# Patient Record
Sex: FEMALE | Employment: STUDENT | URBAN - METROPOLITAN AREA
[De-identification: names, ages, dates, MRNs, and addresses within clinical notes are randomized per-mention and may not be internally consistent; named-entity substitution may affect disease eponyms.]

---

## 2019-01-10 ENCOUNTER — APPOINTMENT (OUTPATIENT)
Dept: OTHER | Facility: HOSPITAL | Age: 24
End: 2019-01-10
Attending: PREVENTIVE MEDICINE

## 2019-06-19 ENCOUNTER — OFFICE VISIT (OUTPATIENT)
Dept: OBGYN CLINIC | Facility: CLINIC | Age: 24
End: 2019-06-19
Payer: COMMERCIAL

## 2019-06-19 VITALS
HEIGHT: 65 IN | HEART RATE: 120 BPM | BODY MASS INDEX: 34.82 KG/M2 | DIASTOLIC BLOOD PRESSURE: 94 MMHG | SYSTOLIC BLOOD PRESSURE: 136 MMHG | WEIGHT: 209 LBS

## 2019-06-19 DIAGNOSIS — Z30.011 BCP (BIRTH CONTROL PILLS) INITIATION: ICD-10-CM

## 2019-06-19 DIAGNOSIS — Z01.419 ENCOUNTER FOR GYNECOLOGICAL EXAMINATION WITHOUT ABNORMAL FINDING: Primary | ICD-10-CM

## 2019-06-19 PROCEDURE — 99385 PREV VISIT NEW AGE 18-39: CPT | Performed by: OBSTETRICS & GYNECOLOGY

## 2019-06-19 RX ORDER — ACETAMINOPHEN AND CODEINE PHOSPHATE 120; 12 MG/5ML; MG/5ML
0.35 SOLUTION ORAL DAILY
Qty: 3 PACKAGE | Refills: 3 | Status: SHIPPED | OUTPATIENT
Start: 2019-06-19 | End: 2020-06-30

## 2019-06-19 RX ORDER — NORGESTIMATE AND ETHINYL ESTRADIOL 0.25-0.035
1 KIT ORAL DAILY
COMMUNITY
End: 2019-06-19

## 2019-06-19 NOTE — PROGRESS NOTES
Well Woman Exam    HPI:  The patient is a 22yo female who presents for annual exam. She has no complaints. She had pap last year with PCP.  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 WWE:   1.  Reviewed ASCCP guidel

## 2020-05-13 ENCOUNTER — TELEPHONE (OUTPATIENT)
Dept: OBGYN CLINIC | Facility: CLINIC | Age: 25
End: 2020-05-13

## 2020-05-13 RX ORDER — ACETAMINOPHEN AND CODEINE PHOSPHATE 120; 12 MG/5ML; MG/5ML
0.35 SOLUTION ORAL DAILY
Qty: 3 PACKAGE | Refills: 3 | OUTPATIENT
Start: 2020-05-13

## 2020-05-13 NOTE — TELEPHONE ENCOUNTER
LAST ANNUAL 6-19-19 AND NOTES FROM THAT APPT INDICATE PT HAD NEGATIVE PAP IN 2018 ALTHOUGH NOT IN OUR SYSTEM. THERE IS NO FUTURE APPT MADE SO SENT BACK FAX DENIED AND NOTED APPT IS REQUIRED.

## 2020-05-20 RX ORDER — ACETAMINOPHEN AND CODEINE PHOSPHATE 120; 12 MG/5ML; MG/5ML
0.35 SOLUTION ORAL DAILY
Qty: 1 PACKAGE | Refills: 0 | Status: SHIPPED | OUTPATIENT
Start: 2020-05-20 | End: 2020-06-17

## 2020-05-20 RX ORDER — ACETAMINOPHEN AND CODEINE PHOSPHATE 120; 12 MG/5ML; MG/5ML
SOLUTION ORAL
Qty: 84 TABLET | Refills: 0 | OUTPATIENT
Start: 2020-05-20

## 2020-06-30 ENCOUNTER — OFFICE VISIT (OUTPATIENT)
Dept: OBGYN CLINIC | Facility: CLINIC | Age: 25
End: 2020-06-30
Payer: COMMERCIAL

## 2020-06-30 VITALS
WEIGHT: 199 LBS | SYSTOLIC BLOOD PRESSURE: 142 MMHG | DIASTOLIC BLOOD PRESSURE: 95 MMHG | BODY MASS INDEX: 33.15 KG/M2 | HEART RATE: 139 BPM | HEIGHT: 65 IN

## 2020-06-30 DIAGNOSIS — Z30.41 ENCOUNTER FOR BIRTH CONTROL PILLS MAINTENANCE: ICD-10-CM

## 2020-06-30 DIAGNOSIS — Z01.419 ENCOUNTER FOR GYNECOLOGICAL EXAMINATION WITHOUT ABNORMAL FINDING: Primary | ICD-10-CM

## 2020-06-30 DIAGNOSIS — Z12.4 CERVICAL CANCER SCREENING: ICD-10-CM

## 2020-06-30 PROCEDURE — 99395 PREV VISIT EST AGE 18-39: CPT | Performed by: OBSTETRICS & GYNECOLOGY

## 2020-06-30 RX ORDER — ACETAMINOPHEN AND CODEINE PHOSPHATE 120; 12 MG/5ML; MG/5ML
0.35 SOLUTION ORAL DAILY
Qty: 3 PACKAGE | Refills: 4 | Status: SHIPPED | OUTPATIENT
Start: 2020-06-30 | End: 2021-03-15

## 2020-06-30 RX ORDER — NORGESTIMATE AND ETHINYL ESTRADIOL 0.25-0.035
KIT ORAL
COMMUNITY
Start: 2019-04-02 | End: 2020-06-30

## 2020-06-30 NOTE — PROGRESS NOTES
Well Woman Exam    HPI:  The patient is a 29yo female who presents today for annual exam. She has no complaints. She is happy with POP. She had to cancel her wedding and will elope in the next few weeks. Wedding in August 2021.  She is nurse in special care Fear of current or ex partner: Not on file        Emotionally abused: Not on file        Physically abused: Not on file        Forced sexual activity: Not on file    Other Topics      Concerns:        Not on file    Social History Narrative      Not on Normal appearance without lesions, no abnormal discharge  Cervix:  Normal without tenderness on motion  Uterus: normal in size, contour, position, mobility, without tenderness  Adnexa: normal without masses or tenderness  Perineum: normal    Assessment/Ariel

## 2020-07-02 DIAGNOSIS — Z78.9 PARTICIPANT IN HEALTH AND WELLNESS PLAN: Primary | ICD-10-CM

## 2021-12-07 ENCOUNTER — NURSE ONLY (OUTPATIENT)
Dept: INTERNAL MEDICINE CLINIC | Facility: HOSPITAL | Age: 26
End: 2021-12-07
Attending: EMERGENCY MEDICINE

## 2021-12-07 DIAGNOSIS — Z00.00 WELLNESS EXAMINATION: Primary | ICD-10-CM

## 2021-12-07 PROCEDURE — 86480 TB TEST CELL IMMUN MEASURE: CPT

## 2022-01-10 ENCOUNTER — TELEPHONE (OUTPATIENT)
Dept: INTERNAL MEDICINE CLINIC | Facility: HOSPITAL | Age: 27
End: 2022-01-10

## 2022-01-10 NOTE — TELEPHONE ENCOUNTER
Outside Covid Testing done    Results and RTW guidelines:    COVID RESULT reported:      Test type:    [x] Rapid outside         [x] PCR outside pending       [] Home Test    Date of test: 1/8/22     Test location: [facility]         [] Result viewed in E ordered; continue to monitor sx and call for new/worsening sx.   Discuss RTW guidelines with manager  [] May continue to work  [] Follow up with PCP  [] Home until further instruction from hotline with [facility] results  INSTRUCTIONS PROVIDED:  [x]  Plan as n Upset stomach []      • Additional symptoms:         Employee has positive COVID Exposure?   Yes [x]     No []      Date of exposure:  ?   [x]  Coworker                       [x] patient                        [] Family/friend    PPE worn:    [x] Mask

## 2024-06-20 ENCOUNTER — NURSE ONLY (OUTPATIENT)
Dept: INTERNAL MEDICINE CLINIC | Facility: HOSPITAL | Age: 29
End: 2024-06-20
Attending: PREVENTIVE MEDICINE

## 2024-06-20 DIAGNOSIS — Z00.00 WELLNESS EXAMINATION: Primary | ICD-10-CM

## 2024-06-20 PROCEDURE — 86480 TB TEST CELL IMMUN MEASURE: CPT

## 2024-06-21 LAB
M TB IFN-G CD4+ T-CELLS BLD-ACNC: 0.01 IU/ML
M TB TUBERC IFN-G BLD QL: NEGATIVE
M TB TUBERC IGNF/MITOGEN IGNF CONTROL: >10 IU/ML
QFT TB1 AG MINUS NIL: 0.02 IU/ML
QFT TB2 AG MINUS NIL: 0.02 IU/ML

## 2024-10-13 ENCOUNTER — HOSPITAL ENCOUNTER (OUTPATIENT)
Age: 29
Discharge: HOME | End: 2024-10-13
Payer: COMMERCIAL

## 2024-10-13 VITALS
TEMPERATURE: 98 F | OXYGEN SATURATION: 97 % | DIASTOLIC BLOOD PRESSURE: 93 MMHG | SYSTOLIC BLOOD PRESSURE: 140 MMHG | RESPIRATION RATE: 20 BRPM | HEART RATE: 112 BPM

## 2024-10-13 DIAGNOSIS — J18.9 WALKING PNEUMONIA: Primary | ICD-10-CM

## 2024-10-13 PROCEDURE — 99204 OFFICE O/P NEW MOD 45 MIN: CPT

## 2024-10-13 PROCEDURE — 99213 OFFICE O/P EST LOW 20 MIN: CPT

## 2024-10-13 RX ORDER — AMOXICILLIN 500 MG/1
1000 TABLET, FILM COATED ORAL 3 TIMES DAILY
Qty: 42 TABLET | Refills: 0 | Status: SHIPPED | OUTPATIENT
Start: 2024-10-13 | End: 2024-10-13

## 2024-10-13 RX ORDER — AMOXICILLIN 500 MG/1
1000 TABLET, FILM COATED ORAL 3 TIMES DAILY
Qty: 42 TABLET | Refills: 0 | Status: SHIPPED | OUTPATIENT
Start: 2024-10-13 | End: 2024-10-20

## 2024-10-13 RX ORDER — AZITHROMYCIN 250 MG/1
TABLET, FILM COATED ORAL
Qty: 6 TABLET | Refills: 0 | Status: SHIPPED | OUTPATIENT
Start: 2024-10-13 | End: 2024-10-18

## 2024-10-13 RX ORDER — AZITHROMYCIN 250 MG/1
TABLET, FILM COATED ORAL
Qty: 6 TABLET | Refills: 0 | Status: SHIPPED | OUTPATIENT
Start: 2024-10-13 | End: 2024-10-13

## 2024-10-13 NOTE — DISCHARGE INSTRUCTIONS
Take both antibiotics as prescribed.  Continue the over-the-counter remedies suggested to you by OB.  Drink plenty of fluids.  Let your OB know that you are taking the antibiotics.  Schedule follow-up with your primary doctor

## 2024-10-13 NOTE — ED INITIAL ASSESSMENT (HPI)
Cough, chest congestion for 1 1/2 weeks, no fever, occ sore throat, no sob, pt is 8 weeks pregnant with prenatal care

## 2024-10-13 NOTE — ED PROVIDER NOTES
Patient Seen in: [facility]      History     Chief Complaint   Patient presents with    Cough/URI     Stated Complaint: Cold Symptons  Subjective:   28-year-old female with no past medical history presents from home.  Patient is here with cold symptoms for about 10 days.  States she has had a lingering cold and \"I feel like garbage\".  Complaining of cough, congestion, yellow phlegm, sore throat.  No fever.  No shortness of breath or wheezing.  No COVID testing done at home.  She has been using Mucinex, Flonase, minified air for her symptoms.  Patient notes that she is 8 weeks 2 days pregnant.  .  She is currently breast-feeding.  She works as a nurse.  She has no history of pneumonia.  She is a non-smoker    The history is provided by the patient. No  was used.     Objective:   History reviewed. No pertinent past medical history.         Past Surgical History:   Procedure Laterality Date    [place] teeth removed                Social History     Socioeconomic History    Marital status:    Tobacco Use    Smoking status: Never    Smokeless tobacco: Never   Vaping Use    Vaping status: Never Used   Substance and Sexual Activity    Alcohol use: Never    Drug use: Never    Sexual activity: Yes     Birth control/protection: OCP     Social Drivers of Health     Financial Resource Strain: Low Risk  (2023)    Received from [facility]    Overall Financial Resource Strain (CARDIA)     Difficulty of Paying Living Expenses: Not very hard   Food Insecurity: No Food Insecurity (2023)    Received from [facility]    Hunger Vital Sign     Worried About Running Out of Food in the Last Year: Never true     Ran Out of Food in the Last Year: Never true   Transportation Needs: No Transportation Needs (2023)    Received from [facility]    PRAPARE - Transportation     Lack of Transportation (Medical): No     Lack of  Transportation (Non-Medical): No   Stress: No Stress Concern Present (6/26/2023)    Received from [facility]    [employer] [place] [employer] - Occupational Stress Questionnaire     Feeling of Stress : Not at all   Housing Stability: Low Risk  (6/26/2023)    Received from [facility]    Housing Stability Vital Sign     Unable to Pay for Housing in the Last Year: No     Number of Places Lived in the Last Year: 1     Unstable Housing in the Last Year: No            Review of Systems    Positive for stated complaint: Cough/URI    Other systems are as noted in HPI.  Constitutional and vital signs reviewed.      All other systems reviewed and negative except as noted above.    Physical Exam     ED Triage Vitals [10/13/24 1017]   BP (!) 140/93   Pulse 112   Resp 20   Temp 98.3 °F (36.8 °C)   Temp src Oral   SpO2 97 %   O2 Device None (Room air)     Current:BP (!) 140/93   Pulse 112   Temp 98.3 °F (36.8 °C) (Oral)   Resp 20   LMP 08/16/2024 (Exact Date)   SpO2 97%     Physical Exam  Vitals and nursing note reviewed.   Constitutional:       General: She is not in acute distress.     Appearance: Normal appearance. She is not ill-appearing or toxic-appearing.   HENT:      Head: Normocephalic and atraumatic.      Right Ear: Tympanic membrane, ear canal and external ear normal.      Left Ear: Tympanic membrane, ear canal and external ear normal.      Nose: Nose normal.      Mouth/Throat:      Mouth: Mucous membranes are moist.      Pharynx: Oropharynx is clear. No pharyngeal swelling or posterior oropharyngeal erythema.   Eyes:      Pupils: Pupils are equal, round, and reactive to light.   Cardiovascular:      Rate and Rhythm: Regular rhythm. Tachycardia present.      Pulses: Normal pulses.      Comments:   Pulmonary:      Effort: Pulmonary effort is normal. No respiratory distress.      Comments: Slightly diminished on the right.  No wheezing.  No distress.  No  hypoxia.  Pulse ox 97% room air which is normal  Abdominal:      General: Abdomen is flat.      Palpations: Abdomen is soft.      Tenderness: There is no abdominal tenderness.   Musculoskeletal:         General: No signs of injury. Normal range of motion.      Cervical back: Normal range of motion and neck supple.   Skin:     General: Skin is warm and dry.      Capillary Refill: Capillary refill takes less than 2 seconds.   Neurological:      General: No focal deficit present.      Mental Status: She is alert and oriented to person, place, and time.      GCS: GCS eye subscore is 4. GCS verbal subscore is 5. GCS motor subscore is 6.   Psychiatric:         Mood and Affect: Mood normal.         Behavior: Behavior normal.         Thought Content: Thought content normal.         Judgment: Judgment normal.         ED Course   Radiology:  No results found.  Labs Reviewed - No data to display    MDM     Medical Decision Making  Differential diagnoses reflecting the complexity of care include: Viral URI, COVID, pneumonia  Patient with persistent cold symptoms for 10 days despite over-the-counter therapy.  Discussed COVID testing deferred given length of symptoms discussed the option of chest x-ray but patient is 8 weeks pregnant.  She is diminished on the right without acute respiratory distress or hypoxia.  Shared decision making with patient and will treat with antibiotics for pneumonia  She is pregnant and breast-feeding.  Amoxicillin and Zithromax considered safe but I recommend she speak with OB    Plan of Care: Amoxicillin.  Zithromax.  Continue OTC remedies.  Push fluids.  Talk with OB about medications.  Recheck with primary doctor    Results and plan of care discussed with the patient/family. They are in agreement with discharge. They understand to follow up with their primary doctor or the referral physician for further evaluation, especially if no improvement.  Also discussed the limitations of immediate care,  patient is aware that if symptoms are worse they should go to the emergency room. Verbal and written discharge instructions were given.     My independent interpretation of studies of: N/A  Diagnostic tests and medications considered but not ordered were: Chest x-ray, COVID testing  Shared decision making was done by: Patient agreeable to antibiotic treatment for pneumonia.  Discussed the option of COVID testing, patient declined  Comorbidities that add complexity to management include: Currently pregnant  External chart review was done and was noted: Reviewed, noncontributory  History obtained by an independent source was from: N/A  Discussions and management was done with: N/A  Social determinants of health that affect care: Currently pregnant              Problems Addressed:  Walking pneumonia: acute illness or injury    Risk  OTC drugs.  Prescription drug management.        Disposition and Plan     Clinical Impression:  1. Walking pneumonia         Disposition:  Discharge  10/13/2024 10:26 am    Follow-up:  Deion Espinosa MD  [facility] [place] [address]  [phone]                Medications Prescribed:  Discharge Medication List as of 10/13/2024 10:35 AM        START taking these medications    Details   azithromycin (ZITHROMAX Z-JEANNINE) 250 MG Oral Tab 500 mg once followed by 250 mg daily x 4 days, Normal, Disp-6 tablet, R-0      amoxicillin 500 MG Oral Tab Take 2 tablets (1,000 mg total) by mouth 3 (three) times daily for 7 days., Normal, Disp-42 tablet, R-0

## 2024-12-23 ENCOUNTER — HOSPITAL ENCOUNTER (OUTPATIENT)
Age: 29
Discharge: HOME | End: 2024-12-23
Payer: COMMERCIAL

## 2024-12-23 VITALS
DIASTOLIC BLOOD PRESSURE: 98 MMHG | RESPIRATION RATE: 18 BRPM | WEIGHT: 223 LBS | BODY MASS INDEX: 37.15 KG/M2 | TEMPERATURE: 98 F | SYSTOLIC BLOOD PRESSURE: 140 MMHG | HEART RATE: 101 BPM | HEIGHT: 65 IN | OXYGEN SATURATION: 99 %

## 2024-12-23 DIAGNOSIS — R05.8 PRODUCTIVE COUGH: Primary | ICD-10-CM

## 2024-12-23 DIAGNOSIS — R06.2 WHEEZING: ICD-10-CM

## 2024-12-23 DIAGNOSIS — R09.81 NASAL CONGESTION WITH RHINORRHEA: ICD-10-CM

## 2024-12-23 DIAGNOSIS — J34.89 NASAL CONGESTION WITH RHINORRHEA: ICD-10-CM

## 2024-12-23 LAB
#MXD IC: 1 X10ˆ3/UL (ref 0.1–1)
BUN BLD-MCNC: <5 MG/DL (ref 7–18)
CHLORIDE BLD-SCNC: 107 MMOL/L (ref 98–112)
CO2 BLD-SCNC: 19 MMOL/L (ref 21–32)
CREAT BLD-MCNC: 0.6 MG/DL
EGFRCR SERPLBLD CKD-EPI 2021: 123 ML/MIN/1.73M2 (ref 60–?)
GLUCOSE BLD-MCNC: 93 MG/DL (ref 70–99)
HCT VFR BLD AUTO: 37.9 %
HCT VFR BLD CALC: 39 %
HGB BLD-MCNC: 12.6 G/DL
ISTAT IONIZED CALCIUM FOR CHEM 8: 1.21 MMOL/L (ref 1.12–1.32)
LYMPHOCYTES # BLD AUTO: 2.3 X10ˆ3/UL (ref 1–4)
LYMPHOCYTES NFR BLD AUTO: 18.6 %
MCH RBC QN AUTO: 29.1 PG (ref 26–34)
MCHC RBC AUTO-ENTMCNC: 33.2 G/DL (ref 31–37)
MCV RBC AUTO: 87.5 FL (ref 80–100)
MIXED CELL %: 7.9 %
NEUTROPHILS # BLD AUTO: 9 X10ˆ3/UL (ref 1.5–7.7)
NEUTROPHILS NFR BLD AUTO: 73.5 %
PLATELET # BLD AUTO: 306 X10ˆ3/UL (ref 150–450)
POCT INFLUENZA A: NEGATIVE
POCT INFLUENZA B: NEGATIVE
POTASSIUM BLD-SCNC: 3.6 MMOL/L (ref 3.6–5.1)
RBC # BLD AUTO: 4.33 X10ˆ6/UL
S PYO AG THROAT QL IA.RAPID: NEGATIVE
SARS-COV-2 RNA RESP QL NAA+PROBE: NOT DETECTED
SODIUM BLD-SCNC: 139 MMOL/L (ref 136–145)
TROPONIN I BLD-MCNC: <0.02 NG/ML
WBC # BLD AUTO: 12.3 X10ˆ3/UL (ref 4–11)

## 2024-12-23 PROCEDURE — 87651 STREP A DNA AMP PROBE: CPT | Performed by: PHYSICIAN ASSISTANT

## 2024-12-23 PROCEDURE — 84484 ASSAY OF TROPONIN QUANT: CPT

## 2024-12-23 PROCEDURE — 96360 HYDRATION IV INFUSION INIT: CPT

## 2024-12-23 PROCEDURE — 87502 INFLUENZA DNA AMP PROBE: CPT | Performed by: PHYSICIAN ASSISTANT

## 2024-12-23 PROCEDURE — 99215 OFFICE O/P EST HI 40 MIN: CPT

## 2024-12-23 PROCEDURE — 80047 BASIC METABLC PNL IONIZED CA: CPT

## 2024-12-23 PROCEDURE — 85025 COMPLETE CBC W/AUTO DIFF WBC: CPT | Performed by: PHYSICIAN ASSISTANT

## 2024-12-23 PROCEDURE — 93010 ELECTROCARDIOGRAM REPORT: CPT

## 2024-12-23 PROCEDURE — 99214 OFFICE O/P EST MOD 30 MIN: CPT

## 2024-12-23 PROCEDURE — 93005 ELECTROCARDIOGRAM TRACING: CPT

## 2024-12-23 RX ORDER — AMOXICILLIN 500 MG/1
1000 TABLET, FILM COATED ORAL 3 TIMES DAILY
Qty: 30 TABLET | Refills: 0 | Status: SHIPPED | OUTPATIENT
Start: 2024-12-23 | End: 2024-12-28

## 2024-12-23 RX ORDER — PREDNISONE 20 MG/1
40 TABLET ORAL DAILY
Qty: 8 TABLET | Refills: 0 | Status: SHIPPED | OUTPATIENT
Start: 2024-12-23 | End: 2024-12-27

## 2024-12-23 RX ORDER — AZITHROMYCIN 250 MG/1
TABLET, FILM COATED ORAL
Qty: 6 TABLET | Refills: 0 | Status: SHIPPED | OUTPATIENT
Start: 2024-12-23 | End: 2024-12-28

## 2024-12-23 RX ORDER — SODIUM CHLORIDE 9 MG/ML
1000 INJECTION, SOLUTION INTRAVENOUS ONCE
Status: COMPLETED | OUTPATIENT
Start: 2024-12-23 | End: 2024-12-23

## 2024-12-23 NOTE — ED INITIAL ASSESSMENT (HPI)
C/o 3-4 days of cough, nasal congestion, sore throat and some wheezing at night when lying down. No fever. 18 weeks weeks pregnant G2P

## 2024-12-23 NOTE — DISCHARGE INSTRUCTIONS
Please return to the ER/clinic if symptoms worsen. Follow-up with your PCP in 24-48 hours as needed.    Push fluids. Take the full course of antibiotics as prescribed.  Use your inhaler as needed.  You may take the course of prednisone but consult with your OB first.  If anything changes i.e. increasing heart rates blood pressure chest pain shortness of breath abdominal pain vaginal discharge bleeding fevers etc. go directly to the ER for further evaluation and treatment.  Otherwise close follow-up with your OB for further evaluation and treatment.

## 2024-12-23 NOTE — ED PROVIDER NOTES
Patient Seen in: [facility]      History     Chief Complaint   Patient presents with    Cough/URI    Sore Throat     Stated Complaint: Sore Throat    Subjective:   HPI    37 yo female  who is approximately 18 weeks pregnant  A0 here with complaint of sore throat and a wheezy cough for the past several days.  Patient used an inhaler prior to arrival this morning.  Patient states that her pressure has been intermittently elevated and she is being followed closely by her OB.  Patient denies chest pain, shortness of breath dyspnea on exertion, abdominal pain nausea, vomiting or diarrhea.  Patient denies vaginal discharge or bleeding.  Patient denies dysuria, hematuria or flank pain.  Afebrile.      Objective:     History reviewed. No pertinent past medical history.           Past Surgical History:   Procedure Laterality Date    [place] teeth removed                The patient's medication list, past medical history and social history elements  as listed in today's nurse's notes are reviewed and agree.   The patient's family history is reviewed and is noncontributory to the presenting problem, except as indicated as above.     Social History     Socioeconomic History    Marital status:    Tobacco Use    Smoking status: Never    Smokeless tobacco: Never   Vaping Use    Vaping status: Never Used   Substance and Sexual Activity    Alcohol use: Never    Drug use: Never    Sexual activity: Yes     Birth control/protection: OCP     Social Drivers of Health     Financial Resource Strain: Low Risk  (2023)    Received from [facility]    Overall Financial Resource Strain (CARDIA)     Difficulty of Paying Living Expenses: Not very hard   Food Insecurity: No Food Insecurity (2023)    Received from [facility]    Hunger Vital Sign     Worried About Running Out of Food in the Last Year: Never true     Ran Out of Food in the Last Year: Never true    Transportation Needs: No Transportation Needs (6/26/2023)    Received from [facility]    PRAPARE - Transportation     Lack of Transportation (Medical): No     Lack of Transportation (Non-Medical): No   Stress: No Stress Concern Present (6/26/2023)    Received from [facility]    [employer] [place] [employer] - Occupational Stress Questionnaire     Feeling of Stress : Not at all   Housing Stability: Low Risk  (6/26/2023)    Received from [facility]    Housing Stability Vital Sign     Unable to Pay for Housing in the Last Year: No     Number of Places Lived in the Last Year: 1     Unstable Housing in the Last Year: No              Review of Systems    Positive for stated complaint: Sore Throat  Other systems are as noted in HPI.  Constitutional and vital signs reviewed.      All other systems reviewed and negative except as noted above.    Physical Exam     ED Triage Vitals   BP 12/23/24 0827 (!) 149/110   Pulse 12/23/24 0819 (!) 127   Resp 12/23/24 0819 18   Temp 12/23/24 0819 98 °F (36.7 °C)   Temp src 12/23/24 0819 Oral   SpO2 12/23/24 0819 99 %   O2 Device 12/23/24 0819 None (Room air)       Current Vitals:   Vital Signs  BP: (!) 140/98  Pulse: 101  Resp: 18  Temp: 98 °F (36.7 °C)  Temp src: Oral    Oxygen Therapy  SpO2: 99 %  O2 Device: None (Room air)        Physical Exam  Vitals and nursing note reviewed.   Constitutional:       Appearance: She is well-developed.   HENT:      Head: Normocephalic.      Jaw: There is normal jaw occlusion.      Right Ear: External ear normal. Tympanic membrane is bulging.      Left Ear: External ear normal. Tympanic membrane is bulging.      Nose: Mucosal edema, congestion and rhinorrhea present. Rhinorrhea is clear.      Mouth/Throat:      Lips: Pink.      Mouth: Mucous membranes are moist.      Pharynx: Posterior oropharyngeal erythema present.      Comments: Uvula midline: No trismus or drooling: No  peritonsillar abscess noted moderate cobblestoning the posterior pharynx.  Eyes:      Conjunctiva/sclera: Conjunctivae normal.      Pupils: Pupils are equal, round, and reactive to light.   Cardiovascular:      Rate and Rhythm: Normal rate and regular rhythm.      Heart sounds: Normal heart sounds.   Pulmonary:      Effort: Pulmonary effort is normal.      Breath sounds: Decreased breath sounds, wheezing and rhonchi present.      Comments: Upper airway congestion  Musculoskeletal:      Cervical back: Normal range of motion and neck supple.   Skin:     General: Skin is warm.      Capillary Refill: Capillary refill takes less than 2 seconds.   Neurological:      General: No focal deficit present.      Mental Status: She is alert and oriented to person, place, and time.   Psychiatric:         Mood and Affect: Mood normal.         Behavior: Behavior normal.         Thought Content: Thought content normal.         Judgment: Judgment normal.           ED Course     Labs Reviewed   POCT CBC - Abnormal; Notable for the following components:       Result Value    WBC IC 12.3 (*)     # Neutrophil 9.0 (*)     All other components within normal limits   POCT ISTAT CHEM8 CARTRIDGE - Abnormal; Notable for the following components:    ISTAT BUN <5 (*)     ISTAT TCO2 19 (*)     All other components within normal limits   ISTAT TROPONIN - Normal   RAPID SARS-COV-2 BY PCR - Normal   POCT FLU TEST - Normal    Narrative:     This assay is a rapid molecular in vitro test utilizing nucleic acid amplification of influenza A and B viral RNA.   RAPID STREP A - Normal     EKG    Rate, intervals and axes as noted on EKG Report.  Rate: 124 BPM  Rhythm: sinus tachycardia  Reading: Otherwise NL EKG            NOTE: Patient has ronchi/ decreased lungs sounds as well as wheezing.  Patient does not want to get any x-ray imaging . Discussed the fact that the D-dimer will most likely be elevated.  Thus patient prefers not to get it which will lead to  CTA etc.  Patient denies any dyspnea or chest pain at this time.  We do believe that the heart rate is elevated due to the inhaler use.  She will follow this closely with her OB.  NOTE: FHT's: 168-172    NOTE: The heart weight went down to 101 as well as a pressure of 140/98.  On auscultation we will treat the patient for pneumonia.  Will also send in some steroids however she will consult with her OB in regards to that.Case discussed with attending .         [facility]   Clinical Impression: productive cough/wheezing/rhinorrhea with nasal congestion  Course of Treatment:   Push fluids. Take the full course of antibiotics as prescribed.  Use your inhaler as needed.  You may take the course of prednisone but consult with your OB first.  If anything changes i.e. increasing heart rates blood pressure chest pain shortness of breath abdominal pain vaginal discharge bleeding fevers etc. go directly to the ER for further evaluation and treatment.  Otherwise close follow-up with your OB for further evaluation and treatment.  This case was discussed with the attending physician please see the attestation.     The patient is encouraged to return if any concerning symptoms arise. Additional verbal discharge instructions are given and discussed. Discharge medications are discussed. The patient is in good condition throughout the visit today and remains so upon discharge. I discuss the plan of care with the patient, who expresses understanding. All questions and concerns are addressed to the patient's satisfaction prior to discharge today.  Previous conversations with PCP and charts were reviewed.                Disposition and Plan     Clinical Impression:  1. Productive cough    2. Nasal congestion with rhinorrhea    3. Wheezing         Disposition:  Discharge  12/23/2024 11:01 am    Follow-up:  Deion Espinosa MD  [address] [place] [address]  [phone]                Medications  Prescribed:  Current Discharge Medication List        START taking these medications    Details   predniSONE 20 MG Oral Tab Take 2 tablets (40 mg total) by mouth daily for 4 days.  Qty: 8 tablet, Refills: 0                 Supplementary Documentation:

## 2024-12-24 LAB
ATRIAL RATE: 124 BPM
P AXIS: 26 DEGREES
P-R INTERVAL: 134 MS
Q-T INTERVAL: 316 MS
QRS DURATION: 78 MS
QTC CALCULATION (BEZET): 453 MS
R AXIS: 24 DEGREES
T AXIS: 12 DEGREES
VENTRICULAR RATE: 124 BPM

## 2025-05-12 ENCOUNTER — HOSPITAL ENCOUNTER (OUTPATIENT)
Facility: HOSPITAL | Age: 30
Discharge: HOME | End: 2025-05-12
Attending: OBSTETRICS & GYNECOLOGY | Admitting: OBSTETRICS & GYNECOLOGY
Payer: COMMERCIAL

## 2025-05-12 VITALS
HEART RATE: 127 BPM | WEIGHT: 230 LBS | SYSTOLIC BLOOD PRESSURE: 129 MMHG | HEIGHT: 65 IN | BODY MASS INDEX: 38.32 KG/M2 | DIASTOLIC BLOOD PRESSURE: 86 MMHG

## 2025-05-12 PROCEDURE — 59025 FETAL NON-STRESS TEST: CPT

## 2025-05-12 PROCEDURE — 99213 OFFICE O/P EST LOW 20 MIN: CPT

## 2025-05-12 NOTE — TRIAGE
[facility]  part of [facility]      Triage Note    Leonarda LATHAM Abraham Patient Status:  Outpatient    1995 MRN [id number]   Location [facility] Attending Vicki Delatorre MD   Hosp Day # 0 PCP Deion Espinosa MD      Para:   Estimated Date of Delivery: 25  Gestation: 38w3d    Chief Complaint    Non-stress Test         Allergies:  Allergies[1]    No orders of the defined types were placed in this encounter.      No results found for: \"WBC\", \"HGB\", \"HCT\", \"PLT\", \"CREATSERUM\", \"BUN\", \"NA\", \"K\", \"CL\", \"CO2\", \"GLU\", \"CA\", \"ALB\", \"ALKPHO\", \"BILT\", \"TP\", \"AST\", \"ALT\", \"PTT\", \"INR\", \"PT\", \"T4F\", \"TSH\", \"TSHREFLEX\", \"FRANDY\", \"LIP\", \"GGT\", \"PSA\", \"DDIMER\", \"ESRML\", \"ESRPF\", \"CRP\", \"BNP\", \"MG\", \"PHOS\", \"TROP\", \"TROPHS\", \"CK\", \"CKMB\", \"WAYNE\", \"RPR\", \"B12\", \"ETOH\", \"POCGLU\", \"FFN\"    Clinitek UA  No results found for: \"URCOLOR\", \"URCLA\", \"GLUUR\", \"URINEBILI\", \"URINEKETONE\", \"SPECGRAVITY\", \"PHUR\", \"PROTURINE\", \"UROBILI\", \"URINENITRITE\", \"URINELEUK\", \"URINECUL\"    UA  No results found for: \"COLORUR\", \"CLARITY\", \"SPECGRAVITY\", \"PROUR\", \"GLUUR\", \"KETUR\", \"BILUR\", \"BLOODURINE\", \"NITRITE\", \"UROBILINOGEN\", \"LEUUR\", \"UASA\"    Vitals:    25 1031 25 1109 25 1116   BP:  (!) 141/93 129/86   Pulse:  111 (!) 127   Weight: 104.3 kg (230 lb)     Height: 165.1 cm (5' 5\")         NST  Variability: Moderate           Accelerations: Yes           Decelerations: None            Baseline: 145 BPM           Uterine Irritability: No           Contractions: Irregular                                        Acoustic Stimulator: No           Nonstress Test Interpretation: Reactive           Nonstress Test Second Interpretation: Reactive          FHR Category: Category I             Additional Comments Comments: Pt given discharge instructions on kick counts and to call the office for any questions/concerns. Verblizes understanding. Pt asking about IOL, instructed to call the  office regarding IOL.     Chief Complaint   Patient presents with    Non-stress Test     Sent from office for prolonged monitoring, variables in office. +MARYANNE CHRISTIAN RN  5/12/2025 11:42 AM         [1] No Known Allergies

## 2025-05-12 NOTE — PROGRESS NOTES
Pt is a 29 year old female admitted to TR1/TR1-A.     Chief Complaint   Patient presents with    Non-stress Test     Sent from office for prolonged monitoring, variables in office. +FM      Pt is  38w3d intra-uterine pregnancy.  History obtained, consents signed. Oriented to room, staff, and plan of care.

## 2025-05-18 ENCOUNTER — ORDERS FOR ADMISSION (OUTPATIENT)
Dept: OBGYN UNIT | Facility: HOSPITAL | Age: 30
End: 2025-05-18

## 2025-05-18 RX ORDER — CITRIC ACID/SODIUM CITRATE 334-500MG
30 SOLUTION, ORAL ORAL AS NEEDED
Status: CANCELLED | OUTPATIENT
Start: 2025-05-18

## 2025-05-18 RX ORDER — ACETAMINOPHEN 500 MG
1000 TABLET ORAL EVERY 6 HOURS PRN
Status: CANCELLED | OUTPATIENT
Start: 2025-05-18

## 2025-05-18 RX ORDER — ONDANSETRON 2 MG/ML
4 INJECTION INTRAMUSCULAR; INTRAVENOUS EVERY 6 HOURS PRN
Status: CANCELLED | OUTPATIENT
Start: 2025-05-18

## 2025-05-18 RX ORDER — IBUPROFEN 600 MG/1
600 TABLET, FILM COATED ORAL ONCE AS NEEDED
Status: CANCELLED | OUTPATIENT
Start: 2025-05-18

## 2025-05-18 RX ORDER — TERBUTALINE SULFATE 1 MG/ML
0.25 INJECTION SUBCUTANEOUS AS NEEDED
Status: CANCELLED | OUTPATIENT
Start: 2025-05-18

## 2025-05-18 RX ORDER — ACETAMINOPHEN 500 MG
500 TABLET ORAL EVERY 6 HOURS PRN
Status: CANCELLED | OUTPATIENT
Start: 2025-05-18

## 2025-05-18 RX ORDER — SODIUM CHLORIDE, SODIUM LACTATE, POTASSIUM CHLORIDE, CALCIUM CHLORIDE 600; 310; 30; 20 MG/100ML; MG/100ML; MG/100ML; MG/100ML
INJECTION, SOLUTION INTRAVENOUS AS NEEDED
Status: CANCELLED | OUTPATIENT
Start: 2025-05-18

## 2025-05-18 RX ORDER — DEXTROSE, SODIUM CHLORIDE, SODIUM LACTATE, POTASSIUM CHLORIDE, AND CALCIUM CHLORIDE 5; .6; .31; .03; .02 G/100ML; G/100ML; G/100ML; G/100ML; G/100ML
INJECTION, SOLUTION INTRAVENOUS CONTINUOUS
Status: CANCELLED | OUTPATIENT
Start: 2025-05-18

## 2025-05-18 RX ORDER — LIDOCAINE HYDROCHLORIDE 10 MG/ML
30 INJECTION, SOLUTION EPIDURAL; INFILTRATION; INTRACAUDAL; PERINEURAL ONCE
Status: CANCELLED | OUTPATIENT
Start: 2025-05-18 | End: 2025-05-18

## 2025-05-19 ENCOUNTER — ANESTHESIA (OUTPATIENT)
Dept: OBGYN UNIT | Facility: HOSPITAL | Age: 30
End: 2025-05-19
Payer: COMMERCIAL

## 2025-05-19 ENCOUNTER — HOSPITAL ENCOUNTER (INPATIENT)
Facility: HOSPITAL | Age: 30
LOS: 1 days | Discharge: HOME | End: 2025-05-20
Attending: OBSTETRICS & GYNECOLOGY | Admitting: OBSTETRICS & GYNECOLOGY
Payer: COMMERCIAL

## 2025-05-19 ENCOUNTER — APPOINTMENT (OUTPATIENT)
Dept: OBGYN CLINIC | Facility: HOSPITAL | Age: 30
End: 2025-05-19
Attending: OBSTETRICS & GYNECOLOGY
Payer: COMMERCIAL

## 2025-05-19 ENCOUNTER — ANESTHESIA EVENT (OUTPATIENT)
Dept: OBGYN UNIT | Facility: HOSPITAL | Age: 30
End: 2025-05-19
Payer: COMMERCIAL

## 2025-05-19 PROBLEM — Z87.59 HISTORY OF GESTATIONAL HYPERTENSION: Status: ACTIVE | Noted: 2025-05-19

## 2025-05-19 LAB
ALBUMIN SERPL-MCNC: 3.9 G/DL (ref 3.2–4.8)
ALBUMIN/GLOB SERPL: 1.5 {RATIO} (ref 1–2)
ALP LIVER SERPL-CCNC: 127 U/L (ref 37–98)
ALT SERPL-CCNC: 11 U/L (ref 10–49)
ANION GAP SERPL CALC-SCNC: 10 MMOL/L (ref 0–18)
ANTIBODY SCREEN: NEGATIVE
AST SERPL-CCNC: 17 U/L (ref ?–34)
BASOPHILS # BLD AUTO: 0.02 X10(3) UL (ref 0–0.2)
BASOPHILS NFR BLD AUTO: 0.2 %
BILIRUB SERPL-MCNC: 0.5 MG/DL (ref 0.3–1.2)
BUN BLD-MCNC: 6 MG/DL (ref 9–23)
BUN/CREAT SERPL: 8.8 (ref 10–20)
CALCIUM BLD-MCNC: 8.7 MG/DL (ref 8.7–10.4)
CHLORIDE SERPL-SCNC: 107 MMOL/L (ref 98–112)
CO2 SERPL-SCNC: 21 MMOL/L (ref 21–32)
CREAT BLD-MCNC: 0.68 MG/DL (ref 0.55–1.02)
CREAT UR-SCNC: 24.1 MG/DL
DEPRECATED RDW RBC AUTO: 41.4 FL (ref 35.1–46.3)
EGFRCR SERPLBLD CKD-EPI 2021: 119 ML/MIN/1.73M2 (ref 60–?)
EOSINOPHIL # BLD AUTO: 0.07 X10(3) UL (ref 0–0.7)
EOSINOPHIL NFR BLD AUTO: 0.6 %
ERYTHROCYTE [DISTWIDTH] IN BLOOD BY AUTOMATED COUNT: 13.5 % (ref 11–15)
GLOBULIN PLAS-MCNC: 2.6 G/DL (ref 2–3.5)
GLUCOSE BLD-MCNC: 88 MG/DL (ref 70–99)
HCT VFR BLD AUTO: 35.9 % (ref 35–48)
HGB BLD-MCNC: 11.7 G/DL (ref 12–16)
IMM GRANULOCYTES # BLD AUTO: 0.02 X10(3) UL (ref 0–1)
IMM GRANULOCYTES NFR BLD: 0.2 %
LYMPHOCYTES # BLD AUTO: 3.08 X10(3) UL (ref 1–4)
LYMPHOCYTES NFR BLD AUTO: 28.2 %
MCH RBC QN AUTO: 27.7 PG (ref 26–34)
MCHC RBC AUTO-ENTMCNC: 32.6 G/DL (ref 31–37)
MCV RBC AUTO: 84.9 FL (ref 80–100)
MONOCYTES # BLD AUTO: 0.91 X10(3) UL (ref 0.1–1)
MONOCYTES NFR BLD AUTO: 8.3 %
NEUTROPHILS # BLD AUTO: 6.82 X10 (3) UL (ref 1.5–7.7)
NEUTROPHILS # BLD AUTO: 6.82 X10(3) UL (ref 1.5–7.7)
NEUTROPHILS NFR BLD AUTO: 62.5 %
OSMOLALITY SERPL CALC.SUM OF ELEC: 283 MOSM/KG (ref 275–295)
PLATELET # BLD AUTO: 273 10(3)UL (ref 150–450)
POTASSIUM SERPL-SCNC: 3.8 MMOL/L (ref 3.5–5.1)
PROT SERPL-MCNC: 6.5 G/DL (ref 5.7–8.2)
PROT UR-MCNC: <6 MG/DL (ref ?–14)
RBC # BLD AUTO: 4.23 X10(6)UL (ref 3.8–5.3)
RH BLOOD TYPE: POSITIVE
RH BLOOD TYPE: POSITIVE
SODIUM SERPL-SCNC: 138 MMOL/L (ref 136–145)
T PALLIDUM AB SER QL IA: NONREACTIVE
WBC # BLD AUTO: 10.9 X10(3) UL (ref 4–11)

## 2025-05-19 PROCEDURE — 84156 ASSAY OF PROTEIN URINE: CPT | Performed by: OBSTETRICS & GYNECOLOGY

## 2025-05-19 PROCEDURE — 82570 ASSAY OF URINE CREATININE: CPT | Performed by: OBSTETRICS & GYNECOLOGY

## 2025-05-19 PROCEDURE — 86901 BLOOD TYPING SEROLOGIC RH(D): CPT | Performed by: OBSTETRICS & GYNECOLOGY

## 2025-05-19 PROCEDURE — 80053 COMPREHEN METABOLIC PANEL: CPT | Performed by: OBSTETRICS & GYNECOLOGY

## 2025-05-19 PROCEDURE — 85025 COMPLETE CBC W/AUTO DIFF WBC: CPT | Performed by: OBSTETRICS & GYNECOLOGY

## 2025-05-19 PROCEDURE — 86900 BLOOD TYPING SEROLOGIC ABO: CPT | Performed by: OBSTETRICS & GYNECOLOGY

## 2025-05-19 PROCEDURE — 59200 INSERT CERVICAL DILATOR: CPT

## 2025-05-19 PROCEDURE — 3E0P7VZ INTRODUCTION OF HORMONE INTO FEMALE REPRODUCTIVE, VIA NATURAL OR ARTIFICIAL OPENING: ICD-10-PCS | Performed by: OBSTETRICS & GYNECOLOGY

## 2025-05-19 PROCEDURE — 10907ZC DRAINAGE OF AMNIOTIC FLUID, THERAPEUTIC FROM PRODUCTS OF CONCEPTION, VIA NATURAL OR ARTIFICIAL OPENING: ICD-10-PCS | Performed by: OBSTETRICS & GYNECOLOGY

## 2025-05-19 PROCEDURE — 0U7C7DJ DILATION OF CERVIX WITH INTRALUMINAL DEVICE, TEMPORARY, VIA NATURAL OR ARTIFICIAL OPENING: ICD-10-PCS | Performed by: OBSTETRICS & GYNECOLOGY

## 2025-05-19 PROCEDURE — 86850 RBC ANTIBODY SCREEN: CPT | Performed by: OBSTETRICS & GYNECOLOGY

## 2025-05-19 PROCEDURE — 86780 TREPONEMA PALLIDUM: CPT | Performed by: OBSTETRICS & GYNECOLOGY

## 2025-05-19 PROCEDURE — 3E033VJ INTRODUCTION OF OTHER HORMONE INTO PERIPHERAL VEIN, PERCUTANEOUS APPROACH: ICD-10-PCS | Performed by: OBSTETRICS & GYNECOLOGY

## 2025-05-19 RX ORDER — BUPIVACAINE HCL/0.9 % NACL/PF 0.25 %
5 PLASTIC BAG, INJECTION (ML) EPIDURAL AS NEEDED
Status: DISCONTINUED | OUTPATIENT
Start: 2025-05-19 | End: 2025-05-19

## 2025-05-19 RX ORDER — NALBUPHINE HYDROCHLORIDE 10 MG/ML
10 INJECTION INTRAMUSCULAR; INTRAVENOUS; SUBCUTANEOUS EVERY 4 HOURS PRN
Status: DISCONTINUED | OUTPATIENT
Start: 2025-05-19 | End: 2025-05-19

## 2025-05-19 RX ORDER — BUPIVACAINE HYDROCHLORIDE 2.5 MG/ML
20 INJECTION, SOLUTION EPIDURAL; INFILTRATION; INTRACAUDAL; PERINEURAL ONCE
Status: DISCONTINUED | OUTPATIENT
Start: 2025-05-19 | End: 2025-05-19 | Stop reason: HOSPADM

## 2025-05-19 RX ORDER — SIMETHICONE 80 MG
80 TABLET,CHEWABLE ORAL 3 TIMES DAILY PRN
Status: DISCONTINUED | OUTPATIENT
Start: 2025-05-19 | End: 2025-05-20

## 2025-05-19 RX ORDER — ACETAMINOPHEN 500 MG
500 TABLET ORAL EVERY 6 HOURS PRN
Status: DISCONTINUED | OUTPATIENT
Start: 2025-05-19 | End: 2025-05-20

## 2025-05-19 RX ORDER — NALBUPHINE HYDROCHLORIDE 10 MG/ML
2.5 INJECTION INTRAMUSCULAR; INTRAVENOUS; SUBCUTANEOUS
Status: DISCONTINUED | OUTPATIENT
Start: 2025-05-19 | End: 2025-05-19

## 2025-05-19 RX ORDER — TERBUTALINE SULFATE 1 MG/ML
0.25 INJECTION SUBCUTANEOUS AS NEEDED
Status: DISCONTINUED | OUTPATIENT
Start: 2025-05-19 | End: 2025-05-19 | Stop reason: HOSPADM

## 2025-05-19 RX ORDER — ACETAMINOPHEN 500 MG
500 TABLET ORAL EVERY 6 HOURS PRN
Status: DISCONTINUED | OUTPATIENT
Start: 2025-05-19 | End: 2025-05-19 | Stop reason: HOSPADM

## 2025-05-19 RX ORDER — DEXTROSE, SODIUM CHLORIDE, SODIUM LACTATE, POTASSIUM CHLORIDE, AND CALCIUM CHLORIDE 5; .6; .31; .03; .02 G/100ML; G/100ML; G/100ML; G/100ML; G/100ML
INJECTION, SOLUTION INTRAVENOUS CONTINUOUS
Status: DISCONTINUED | OUTPATIENT
Start: 2025-05-19 | End: 2025-05-19

## 2025-05-19 RX ORDER — DEXTROSE, SODIUM CHLORIDE, SODIUM LACTATE, POTASSIUM CHLORIDE, AND CALCIUM CHLORIDE 5; .6; .31; .03; .02 G/100ML; G/100ML; G/100ML; G/100ML; G/100ML
INJECTION, SOLUTION INTRAVENOUS CONTINUOUS PRN
Status: DISCONTINUED | OUTPATIENT
Start: 2025-05-19 | End: 2025-05-19 | Stop reason: HOSPADM

## 2025-05-19 RX ORDER — DOCUSATE SODIUM 100 MG/1
100 CAPSULE, LIQUID FILLED ORAL
Status: DISCONTINUED | OUTPATIENT
Start: 2025-05-19 | End: 2025-05-20

## 2025-05-19 RX ORDER — HYDROXYZINE HYDROCHLORIDE 50 MG/ML
50 INJECTION, SOLUTION INTRAMUSCULAR EVERY 4 HOURS PRN
Status: DISCONTINUED | OUTPATIENT
Start: 2025-05-19 | End: 2025-05-19

## 2025-05-19 RX ORDER — CITRIC ACID/SODIUM CITRATE 334-500MG
30 SOLUTION, ORAL ORAL AS NEEDED
Status: DISCONTINUED | OUTPATIENT
Start: 2025-05-19 | End: 2025-05-19 | Stop reason: HOSPADM

## 2025-05-19 RX ORDER — ONDANSETRON 2 MG/ML
4 INJECTION INTRAMUSCULAR; INTRAVENOUS EVERY 6 HOURS PRN
Status: DISCONTINUED | OUTPATIENT
Start: 2025-05-19 | End: 2025-05-20

## 2025-05-19 RX ORDER — AMMONIA 15 % (W/V)
0.3 AMPUL (EA) INHALATION AS NEEDED
Status: DISCONTINUED | OUTPATIENT
Start: 2025-05-19 | End: 2025-05-20

## 2025-05-19 RX ORDER — LIDOCAINE HYDROCHLORIDE 10 MG/ML
30 INJECTION, SOLUTION EPIDURAL; INFILTRATION; INTRACAUDAL; PERINEURAL ONCE
Status: DISCONTINUED | OUTPATIENT
Start: 2025-05-19 | End: 2025-05-19 | Stop reason: HOSPADM

## 2025-05-19 RX ORDER — SODIUM CHLORIDE, SODIUM LACTATE, POTASSIUM CHLORIDE, CALCIUM CHLORIDE 600; 310; 30; 20 MG/100ML; MG/100ML; MG/100ML; MG/100ML
INJECTION, SOLUTION INTRAVENOUS CONTINUOUS
Status: DISCONTINUED | OUTPATIENT
Start: 2025-05-19 | End: 2025-05-19 | Stop reason: HOSPADM

## 2025-05-19 RX ORDER — ACETAMINOPHEN 500 MG
1000 TABLET ORAL EVERY 6 HOURS PRN
Status: DISCONTINUED | OUTPATIENT
Start: 2025-05-19 | End: 2025-05-20

## 2025-05-19 RX ORDER — BISACODYL 10 MG
10 SUPPOSITORY, RECTAL RECTAL ONCE AS NEEDED
Status: DISCONTINUED | OUTPATIENT
Start: 2025-05-19 | End: 2025-05-20

## 2025-05-19 RX ORDER — IBUPROFEN 600 MG/1
600 TABLET, FILM COATED ORAL ONCE AS NEEDED
Status: DISCONTINUED | OUTPATIENT
Start: 2025-05-19 | End: 2025-05-19 | Stop reason: HOSPADM

## 2025-05-19 RX ORDER — IBUPROFEN 600 MG/1
600 TABLET, FILM COATED ORAL EVERY 6 HOURS
Status: DISCONTINUED | OUTPATIENT
Start: 2025-05-19 | End: 2025-05-20

## 2025-05-19 RX ORDER — SODIUM CHLORIDE, SODIUM LACTATE, POTASSIUM CHLORIDE, CALCIUM CHLORIDE 600; 310; 30; 20 MG/100ML; MG/100ML; MG/100ML; MG/100ML
INJECTION, SOLUTION INTRAVENOUS AS NEEDED
Status: DISCONTINUED | OUTPATIENT
Start: 2025-05-19 | End: 2025-05-19

## 2025-05-19 RX ORDER — LIDOCAINE HYDROCHLORIDE AND EPINEPHRINE 15; 5 MG/ML; UG/ML
INJECTION, SOLUTION EPIDURAL
Status: COMPLETED | OUTPATIENT
Start: 2025-05-19 | End: 2025-05-19

## 2025-05-19 RX ORDER — LIDOCAINE HYDROCHLORIDE 10 MG/ML
INJECTION, SOLUTION INFILTRATION; PERINEURAL
Status: COMPLETED | OUTPATIENT
Start: 2025-05-19 | End: 2025-05-19

## 2025-05-19 RX ORDER — ONDANSETRON 2 MG/ML
4 INJECTION INTRAMUSCULAR; INTRAVENOUS EVERY 6 HOURS PRN
Status: DISCONTINUED | OUTPATIENT
Start: 2025-05-19 | End: 2025-05-19 | Stop reason: HOSPADM

## 2025-05-19 RX ORDER — BUPIVACAINE HYDROCHLORIDE 2.5 MG/ML
INJECTION, SOLUTION EPIDURAL; INFILTRATION; INTRACAUDAL; PERINEURAL
Status: COMPLETED | OUTPATIENT
Start: 2025-05-19 | End: 2025-05-19

## 2025-05-19 RX ORDER — ACETAMINOPHEN 500 MG
1000 TABLET ORAL EVERY 6 HOURS PRN
Status: DISCONTINUED | OUTPATIENT
Start: 2025-05-19 | End: 2025-05-19 | Stop reason: HOSPADM

## 2025-05-19 RX ADMIN — LIDOCAINE HYDROCHLORIDE AND EPINEPHRINE 5 ML: 15; 5 INJECTION, SOLUTION EPIDURAL at 10:45:00

## 2025-05-19 RX ADMIN — LIDOCAINE HYDROCHLORIDE 5 ML: 10 INJECTION, SOLUTION INFILTRATION; PERINEURAL at 10:45:00

## 2025-05-19 RX ADMIN — BUPIVACAINE HYDROCHLORIDE 1 ML: 2.5 INJECTION, SOLUTION EPIDURAL; INFILTRATION; INTRACAUDAL; PERINEURAL at 10:45:00

## 2025-05-19 NOTE — ANESTHESIA PROCEDURE NOTES
Labor Analgesia    Date/Time: 5/19/2025 10:45 AM    Performed by: Carmen Torres MD  Authorized by: Carmen Torres MD      General Information and Staff    Start Time:  5/19/2025 10:45 AM  End Time:  5/19/2025 10:59 AM  Anesthesiologist:  Carmen Torres MD  Performed by:  Anesthesiologist  Patient Location:  OB  Site Identification: surface landmarks    Reason for Block: labor epidural    Preanesthetic Checklist: patient identified, IV checked, site marked, risks and benefits discussed, monitors and equipment checked, pre-op evaluation, timeout performed, anesthesia consent and sterile technique used      Procedure Details    Patient Position:  Sitting  Prep: ChloraPrep    Monitoring:  Heart rate  Approach:  Midline    Epidural Needle    Injection Technique:  SHAWNA air  Needle Type:  Tuohy  Needle Gauge:  18 G  Needle Length:  3.5 in  Needle Insertion Depth:  7  Location:  L2-3    Spinal Needle    Needle Type:  Sprotte tip  Needle Gauge:  27 G    Catheter    Catheter Type:  Multi-orifice  Catheter Size:  20 G  Catheter at Skin Depth:  14  Test Dose:  Negative    Assessment      Additional Comments     Cse 2.5 mg bup

## 2025-05-19 NOTE — H&P
[facility]  part of [facility]    History & Physical    Leonarda Rosario Patient Status:  Inpatient    1995 MRN [id number]   Location [facility] CENTER Attending Audelia Vyas MD   Hosp Day # 0 PCP Deion Espinosa MD     Date of Admission:  2025    Assessment/Plan:     Leonarda Rosario is a 29 year old  female at 39w3d who presents to Labor and Delivery for scheduled induction of labor.    - Admit to Labor and Delivery  - Rh positive / GBS negative / Rubella Immune  - Hgb 11.7, plts 273 on admission  - Labor Induction/Augmentation plan: cytotec x1 on admission, cervical balloon placed, will start pitocin when able, AROM when able  - Anesthesia consulted, epidural prn  - CFM    - Pregnancy complicated by: obesity, history of gestational hypertension      HPI:   Leonarda Rosario is a 29 year old  female at 39w3d who presents to Labor and Delivery for scheduled induction of labor. She reports feeling well, no acute concerns. .      History     Obstetric History:   OB History    Para Term  AB Living   2 1 1 0 0 1   SAB IAB Ectopic Multiple Live Births   0 0 0 0 1      # Outcome Date GA Lbr Singh/2nd Weight Sex Type Anes PTL Lv   2 Current            1 Term 23 37w4d    Vag-Spont   IFTIKHAR       Past Medical History: Past Medical History[1]    Past Surgerical History: Past Surgical History[2]    Social History:   Social History     Tobacco Use    Smoking status: Never    Smokeless tobacco: Never   Substance Use Topics    Alcohol use: Never        Allergies/Medications:   Allergies:   Allergies[3]    Medications:  Prescriptions Prior to Admission[4]      Review of Systems:   Pertinent positives noted in HPI      Physical Exam:   Temp:  [97.9 °F (36.6 °C)-98 °F (36.7 °C)] 97.9 °F (36.6 °C)  Pulse:  [102-120] 102  Resp:  [16-18] 16  BP: (127-128)/(88-90) 128/90    General: alert and oriented, no acute distress  Abdomen: soft,  nontender,  gravid  Cervix: 1-2/80/-3 after cytotec x1  Cervical Cook balloon placed in normal sterile fashion. Uterine and vaginal balloons insufflated with 80cc and 80cc normal saline respectively. Patient tolerated procedure well.  FHT: 135 / moderate variability / positive accelerations / no decelerations  Extremities: normal range of motion  Psychiatric: appropriate          Lela Pfeiffer MD  5/19/2025  9:12 AM         [1]   Past Medical History:   History of gestational hypertension   [2]   Past Surgical History:  Procedure Laterality Date    [place] teeth removed     [3] No Known Allergies  [4]   Medications Prior to Admission   Medication Sig Dispense Refill Last Dose/Taking    Prenatal MV & Min w/FA-DHA (PRENATAL ADULT GUMMY/DHA/FA) 0.4-25 MG Oral Chew Tab    5/19/2025 Morning

## 2025-05-19 NOTE — PROGRESS NOTES
Pt is a 29 year old female admitted to LDR5/LDR5-A.     Chief Complaint   Patient presents with    Scheduled Induction     Elective induction      Pt is  39w3d intra-uterine pregnancy.  History obtained, consents signed. Oriented to room, staff, and plan of care.

## 2025-05-19 NOTE — ANESTHESIA PREPROCEDURE EVALUATION
Anesthesia PreOp Note    HPI:     Leonarda Rosario is a 29 year old female who presents for preoperative consultation requested by: * No surgeons listed *    Date of Surgery: 5/19/2025    * No procedures listed *  Indication: * No pre-op diagnosis entered *    Relevant Problems   No relevant active problems       NPO:                         History Review:  Patient Active Problem List    Diagnosis Date Noted    History of gestational hypertension 05/19/2025    Acute bronchitis 06/29/2012    Routine infant or child health check 06/29/2012       Past Medical History[1]    Past Surgical History[2]    Prescriptions Prior to Admission[3]  Current Medications and Prescriptions Ordered in Epic[4]    Allergies[5]    Family History[6]  Social Hx on file[7]    Available pre-op labs reviewed.  Lab Results   Component Value Date    WBC 10.9 05/19/2025    RBC 4.23 05/19/2025    HGB 11.7 (L) 05/19/2025    HCT 35.9 05/19/2025    MCV 84.9 05/19/2025    MCH 27.7 05/19/2025    MCHC 32.6 05/19/2025    RDW 13.5 05/19/2025    .0 05/19/2025     Lab Results   Component Value Date     05/19/2025    K 3.8 05/19/2025     05/19/2025    CO2 21.0 05/19/2025    BUN 6 (L) 05/19/2025    CREATSERUM 0.68 05/19/2025    GLU 88 05/19/2025    CA 8.7 05/19/2025          Vital Signs:  Body mass index is 38.26 kg/m².   height is 1.651 m (5' 5\") and weight is 104.3 kg (229 lb 15 oz). Her oral temperature is 98 °F (36.7 °C). Her blood pressure is 127/79 and her pulse is 115. Her respiration is 16 and oxygen saturation is 98%.   Vitals:    05/19/25 1030 05/19/25 1052 05/19/25 1053 05/19/25 1055   BP: (!) 127/92 128/90 125/89 127/79   Pulse: 109 106 113 115   Resp:       Temp: 98 °F (36.7 °C)      TempSrc: Oral      SpO2:    98%   Weight:       Height:            Anesthesia Evaluation     Patient summary reviewed and Nursing notes reviewed    No history of anesthetic complications   Airway   Mallampati: I  TM distance: >3 FB  Neck ROM: full   Dental      Pulmonary - negative ROS and normal exam   Cardiovascular - negative ROS and normal exam    Neuro/Psych - negative ROS     GI/Hepatic/Renal - negative ROS     Endo/Other    Abdominal                  Anesthesia Plan:   ASA:  2  Plan:   Epidural  Informed Consent Plan and Risks Discussed With:  Patient      I have informed Leonarda Rosario and/or legal guardian or family member of the nature of the anesthetic plan, benefits, risks including possible dental damage if relevant, major complications, and any alternative forms of anesthetic management.   All of the patient's questions were answered to the best of my ability. The patient desires the anesthetic management as planned.  Carmen Torres MD  5/19/2025 10:58 AM  Present on Admission:  **None**           [1]   Past Medical History:   History of gestational hypertension   [2]   Past Surgical History:  Procedure Laterality Date    [place] teeth removed     [3]   Medications Prior to Admission   Medication Sig Dispense Refill Last Dose/Taking    Prenatal MV & Min w/FA-DHA (PRENATAL ADULT GUMMY/DHA/FA) 0.4-25 MG Oral Chew Tab    5/19/2025 Morning   [4]   Current Facility-Administered Medications Ordered in Epic   Medication Dose Route Frequency Provider Last Rate Last Admin    lactated ringers IV bolus 500 mL  500 mL Intravenous PRN Lela Pfeiffer MD        acetaminophen (Tylenol Extra Strength) tab 500 mg  500 mg Oral Q6H PRN Lela Pfeiffer MD        acetaminophen (Tylenol Extra Strength) tab 1,000 mg  1,000 mg Oral Q6H PRN Lela Pfeiffer MD        ibuprofen (Motrin) tab 600 mg  600 mg Oral Once PRN Lela Pfeiffer MD        ondansetron (Zofran) 4 MG/2ML injection 4 mg  4 mg Intravenous Q6H PRN Lela Pfeiffer MD        oxyTOCIN in sodium chloride 0.9% (Pitocin) 30 Units/500mL infusion premix  62.5-900 elpidio-units/min Intravenous Continuous Lela Pfeiffer MD        terbutaline (Brethine) 1 MG/ML injection 0.25 mg  0.25 mg  Subcutaneous PRN Lela Pfeiffer MD        sodium citrate-citric acid (Bicitra) 500-334 MG/5ML oral solution 30 mL  30 mL Oral PRN Lela Pfeiffer MD        lidocaine PF (Xylocaine-MPF) 1% injection  30 mL Intradermal Once Lela Pfeiffer MD        fentaNYL (Sublimaze) 50 mcg/mL injection 100 mcg  100 mcg Intravenous Once Lela Pfeiffer MD        fentaNYL (Sublimaze) 50 mcg/mL injection 50 mcg  50 mcg Intravenous Q30 Min PRN Lela Pfeiffer MD        oxyTOCIN in sodium chloride 0.9% (Pitocin) 30 Units/500mL infusion premix  0.5-20 elpidio-units/min Intravenous Continuous Lela Pfeiffer MD        dextrose in lactated ringers 5% infusion   Intravenous Continuous PRN Audelia Vyas MD        lactated ringers infusion   Intravenous Continuous Audelia Vyas MD        nalbuphine (Nubain) 10 mg/mL injection 10 mg  10 mg Intramuscular Q4H PRN Audelia Vyas MD        hydrOXYzine 50 mg/mL injection 50 mg  50 mg Intramuscular Q4H PRN Audelia Vyas MD        oxyTOCIN in sodium chloride 0.9% (Pitocin) 30 Units/500mL infusion premix  0.5-20 elpidio-units/min Intravenous Continuous Lela Pfeiffer MD 2 mL/hr at 05/19/25 1015 2 elpidio-units/min at 05/19/25 1015    fentaNYL-bupivacaine 2 mcg/mL-0.125% in sodium chloride 0.9% 100 mL EPIDURAL infusion premix   Epidural Continuous Carmen Torres MD        fentaNYL (Sublimaze) 50 mcg/mL injection 100 mcg  100 mcg Epidural Once Carmen Torres MD        bupivacaine PF (Marcaine) 0.25% injection  20 mL Epidural Once Carmen Torres MD        ePHEDrine (PF) 25 MG/5 ML injection 5 mg  5 mg Intravenous PRN Carmen Torres MD        nalbuphine (Nubain) 10 mg/mL injection 2.5 mg  2.5 mg Intravenous Q15 Min PRN Carmen oTrres MD         No current Epic-ordered outpatient medications on file.   [5] No Known Allergies  [6]   Family History  Problem Relation Age of Onset    Hypertension Mother    [7]   Social History  Socioeconomic History     Marital status:    Tobacco Use    Smoking status: Never    Smokeless tobacco: Never   Vaping Use    Vaping status: Never Used   Substance and Sexual Activity    Alcohol use: Never    Drug use: Never    Sexual activity: Yes     Birth control/protection: OCP

## 2025-05-19 NOTE — L&D DELIVERY NOTE
Vaginal Delivery Note    Patient complete and pushing with contractions. Head delivered AMANDEEP over perineum followed without hesitation or delay by anterior shoulder and remainder of infant body.  Viable female infant  (\"Pi[place]\"). APGARS as below. Cord cut and clamped at 60 seconds. Cord gases and cord blood collected. Umbilical cord avulsed rapidly from placenta, placenta delivered intact with manual extraction and fundal massage. Uterus explored noting no retained products and adequate tone. No lacerations noted. Patient tolerated procedure well. EBL 150cc.      Malissa Rosario [[id number]]      Labor Events     labor?: No   steroids?: None  Antibiotics received during labor?: No  Rupture date/time: 2025 1159     Rupture type: AROM  Fluid color: Clear  Labor type: Induced Onset of Labor  Induction: Misoprostol, Oxytocin, AROM  Intrapartum & labor complications: Variable decelerations       Labor Event Times    Labor onset date/time: 2025 1156  Dilation complete date/time: 2025 1530  Start pushing date/time: 2025 15:37        Presentation    Presentation: Vertex  Position: Right Occiput Anterior       Operative Delivery    Operative Vaginal Delivery: No                Shoulder Dystocia    Shoulder Dystocia: No       Anesthesia    Method: Epidural              [place] Delivery      Head delivery date/time: 2025 15:49:59   Delivery date/time:  25 15:50:08   Delivery type: Normal spontaneous vaginal delivery    Details:     Delivery location: delivery room  Delivery Room Temperature: 74       Delivery Providers    Delivering Clinician: Lela Pfeiffer MD   Delivery personnel:  Provider Role   Archana Abraham RN Baby Nurse   Romero Acosta RN Delivery Nurse   Conchita Sky Surgical Tech             Cord    Vessels: 3 Vessels  Complications: None  Timed cord clamping: Yes  Time in sec: 60  Cord blood disposition: to lab  Gases sent?: Yes        Resuscitation    Method: None       [place] Measurements    No data filed       Placenta    Date/time: 2025 15:55  Removal: Spontaneous  Appearance: Intact  Disposition: Discarded       Apgars    Living status: Living   Apgar Scoring Key:    0 1 2    Skin color Blue or pale Acrocyanotic Completely pink    Heart rate Absent <100 bpm >100 bpm    Reflex irritability No response [place] Cry or active withdrawal    Muscle tone Limp Some flexion Active motion    Respiratory effort Absent Weak cry; hypoventilation Good, crying              1 Minute:  5 Minute:  10 Minute:  15 Minute:  20 Minute:      Skin color: 0  1       Heart rate: 2  2       Reflex irritablity: 2  2       Muscle tone: 2  2       Respiratory effort: 2  2       Total: 8  9          Apgars assigned by: STEVE ALBERTO RN  [place] disposition: with mother       Skin to Skin    Skin to skin initiated date/time: 2025 1550  Skin to skin with: Mother       Vaginal Count    Initial count RN: Romero Acosta RN  Initial count Tech: Morales, Olivia   Sponges   Sharps    Initial counts 10   0    Final counts 10   0    Final count RN: Romero Acosta RN  Final count MD: Lela Pfeiffer MD       Lacerations    Episiotomy: None  Perineal lacerations: None      Vaginal laceration?: No      Cervical laceration?: No    Clitoral laceration?: No    Quantitative blood loss (mL): 35

## 2025-05-19 NOTE — PLAN OF CARE
Problem: BIRTH - VAGINAL/ SECTION  Goal: Fetal and maternal status remain reassuring during the birth process  Description: INTERVENTIONS:  - Monitor vital signs  - Monitor fetal heart rate  - Monitor uterine activity  - Monitor labor progression (vaginal delivery)  - DVT prophylaxis (C/S delivery)  - Surgical antibiotic prophylaxis (C/S delivery)  Outcome: Progressing     Problem: PAIN - ADULT  Goal: Verbalizes/displays adequate comfort level or patient's stated pain goal  Description: INTERVENTIONS:  - Encourage pt to monitor pain and request assistance  - Assess pain using appropriate pain scale  - Administer analgesics based on type and severity of pain and evaluate response  - Implement non-pharmacological measures as appropriate and evaluate response  - Consider cultural and social influences on pain and pain management  - Manage/alleviate anxiety  - Utilize distraction and/or relaxation techniques  - Monitor for opioid side effects  - Notify MD/LIP if interventions unsuccessful or patient reports new pain  - Anticipate increased pain with activity and pre-medicate as appropriate  Outcome: Progressing     Problem: ANXIETY  Goal: Will report anxiety at manageable levels  Description: INTERVENTIONS:  - Administer medication as ordered  - Teach and rehearse alternative coping skills  - Provide emotional support with 1:1 interaction with staff  Outcome: Progressing     Problem: Patient Centered Care  Goal: Patient preferences are identified and integrated in the patient's plan of care  Description: Interventions:  - What would you like us to know as we care for you? Safe delivery  - Provide timely, complete, and accurate information to patient/family  - Incorporate patient and family knowledge, values, beliefs, and cultural backgrounds into the planning and delivery of care  - Encourage patient/family to participate in care and decision-making at the level they choose  - Honor patient and family  perspectives and choices  Outcome: Progressing     Problem: Patient/Family Goals  Goal: Patient/Family Long Term Goal  Description: Patient's Long Term Goal: Uncomplicated Delivery     Interventions:  - Assessment/Monitoring  - Induction/Augmentation per protocol and Provider order  - C/S per protocol and Provider order   - Education  - Intervention per protocol and Provider order with education   - Involve patient in POC  - See additional Care Plan goals for specific interventions    Outcome: Progressing  Goal: Patient/Family Short Term Goal  Description: Patient's Short Term Goal: Comfort and Pain Control     Interventions:   - Non Pharmacological pain intervention   - IV/IM and Epidural pain medication per Provider order and patient request  - Education  - Involve Patient in POC   - See additional Care Plan goals for specific interventions      Outcome: Progressing

## 2025-05-19 NOTE — PROGRESS NOTES
Patient up to bathroom with assist x 2.  Voided 800 Patient transferred to mother/baby room 360 per wheelchair in stable condition with baby and personal belongings.  Accompanied by significant other and staff.  Report given to Caitie mother/baby ANDRZEJ.

## 2025-05-19 NOTE — ANESTHESIA POSTPROCEDURE EVALUATION
Patient: Leonarda Rosario    Procedure Summary       Date: 05/19/25 Room / Location:     Anesthesia Start: 1059 Anesthesia Stop: 1555    Procedure: LABOR ANALGESIA Diagnosis:     Scheduled Providers:  Anesthesiologist: Carmen Torres MD    Anesthesia Type: epidural ASA Status: 2            Anesthesia Type: epidural    Vitals Value Taken Time   /84 05/19/25 16:45   Temp 98.3 °F (36.8 °C) 05/19/25 16:00   Pulse 98 05/19/25 16:54   Resp 16 05/19/25 16:00   SpO2 99 % 05/19/25 16:54   Vitals shown include unfiled device data.    [facility] AN Post Evaluation:   Patient Evaluated in floor  Patient Participation: complete - patient participated  Level of Consciousness: awake  Pain Management: adequate  Airway Patency:patent  Yes    Nausea/Vomiting: none  Cardiovascular Status: acceptable  Respiratory Status: acceptable  Postoperative Hydration acceptable      Carmen Torres MD  5/19/2025 5:05 PM

## 2025-05-20 VITALS
HEIGHT: 65 IN | TEMPERATURE: 98 F | DIASTOLIC BLOOD PRESSURE: 91 MMHG | BODY MASS INDEX: 38.31 KG/M2 | WEIGHT: 229.94 LBS | OXYGEN SATURATION: 99 % | SYSTOLIC BLOOD PRESSURE: 118 MMHG | RESPIRATION RATE: 16 BRPM | HEART RATE: 78 BPM

## 2025-05-20 LAB
BASOPHILS # BLD AUTO: 0.03 X10(3) UL (ref 0–0.2)
BASOPHILS NFR BLD AUTO: 0.2 %
DEPRECATED RDW RBC AUTO: 43 FL (ref 35.1–46.3)
EOSINOPHIL # BLD AUTO: 0.08 X10(3) UL (ref 0–0.7)
EOSINOPHIL NFR BLD AUTO: 0.6 %
ERYTHROCYTE [DISTWIDTH] IN BLOOD BY AUTOMATED COUNT: 13.6 % (ref 11–15)
HCT VFR BLD AUTO: 31.3 % (ref 35–48)
HGB BLD-MCNC: 10.2 G/DL (ref 12–16)
IMM GRANULOCYTES # BLD AUTO: 0.04 X10(3) UL (ref 0–1)
IMM GRANULOCYTES NFR BLD: 0.3 %
LYMPHOCYTES # BLD AUTO: 3.07 X10(3) UL (ref 1–4)
LYMPHOCYTES NFR BLD AUTO: 24.6 %
MCH RBC QN AUTO: 28.3 PG (ref 26–34)
MCHC RBC AUTO-ENTMCNC: 32.6 G/DL (ref 31–37)
MCV RBC AUTO: 86.9 FL (ref 80–100)
MONOCYTES # BLD AUTO: 1.07 X10(3) UL (ref 0.1–1)
MONOCYTES NFR BLD AUTO: 8.6 %
NEUTROPHILS # BLD AUTO: 8.21 X10 (3) UL (ref 1.5–7.7)
NEUTROPHILS # BLD AUTO: 8.21 X10(3) UL (ref 1.5–7.7)
NEUTROPHILS NFR BLD AUTO: 65.7 %
PLATELET # BLD AUTO: 222 10(3)UL (ref 150–450)
RBC # BLD AUTO: 3.6 X10(6)UL (ref 3.8–5.3)
WBC # BLD AUTO: 12.5 X10(3) UL (ref 4–11)

## 2025-05-20 PROCEDURE — 85025 COMPLETE CBC W/AUTO DIFF WBC: CPT | Performed by: OBSTETRICS & GYNECOLOGY

## 2025-05-20 RX ORDER — ACETAMINOPHEN 500 MG
500 TABLET ORAL EVERY 6 HOURS PRN
Qty: 60 TABLET | Refills: 0 | Status: SHIPPED | OUTPATIENT
Start: 2025-05-20

## 2025-05-20 RX ORDER — IBUPROFEN 600 MG/1
600 TABLET, FILM COATED ORAL EVERY 6 HOURS
Qty: 60 TABLET | Refills: 0 | Status: SHIPPED | OUTPATIENT
Start: 2025-05-20

## 2025-05-20 NOTE — PLAN OF CARE
Sat with patient to review plan of care. Discussed analgesic options and answered all questions. VSS. Breastfeeding on demand. Breastfeeding successfully. Voiding independently. Lochia is WNL. Uterus is firm.     Problem: BIRTH - VAGINAL/ SECTION  Goal: Fetal and maternal status remain reassuring during the birth process  Description: INTERVENTIONS:  - Monitor vital signs  - Monitor fetal heart rate  - Monitor uterine activity  - Monitor labor progression (vaginal delivery)  - DVT prophylaxis (C/S delivery)  - Surgical antibiotic prophylaxis (C/S delivery)  Outcome: Progressing     Problem: PAIN - ADULT  Goal: Verbalizes/displays adequate comfort level or patient's stated pain goal  Description: INTERVENTIONS:  - Encourage pt to monitor pain and request assistance  - Assess pain using appropriate pain scale  - Administer analgesics based on type and severity of pain and evaluate response  - Implement non-pharmacological measures as appropriate and evaluate response  - Consider cultural and social influences on pain and pain management  - Manage/alleviate anxiety  - Utilize distraction and/or relaxation techniques  - Monitor for opioid side effects  - Notify MD/LIP if interventions unsuccessful or patient reports new pain  - Anticipate increased pain with activity and pre-medicate as appropriate  Outcome: Progressing     Problem: ANXIETY  Goal: Will report anxiety at manageable levels  Description: INTERVENTIONS:  - Administer medication as ordered  - Teach and rehearse alternative coping skills  - Provide emotional support with 1:1 interaction with staff  Outcome: Progressing     Problem: Patient Centered Care  Goal: Patient preferences are identified and integrated in the patient's plan of care  Description: Interventions:  - What would you like us to know as we care for you? Safe delivery  - Provide timely, complete, and accurate information to patient/family  - Incorporate patient and family knowledge,  values, beliefs, and cultural backgrounds into the planning and delivery of care  - Encourage patient/family to participate in care and decision-making at the level they choose  - Honor patient and family perspectives and choices  Outcome: Progressing     Problem: Patient/Family Goals  Goal: Patient/Family Long Term Goal  Description: Patient's Long Term Goal: Uncomplicated Delivery     Interventions:  - Assessment/Monitoring  - Induction/Augmentation per protocol and Provider order  - C/S per protocol and Provider order   - Education  - Intervention per protocol and Provider order with education   - Involve patient in POC  - See additional Care Plan goals for specific interventions    Outcome: Progressing  Goal: Patient/Family Short Term Goal  Description: Patient's Short Term Goal: Comfort and Pain Control     Interventions:   - Non Pharmacological pain intervention   - IV/IM and Epidural pain medication per Provider order and patient request  - Education  - Involve Patient in POC   - See additional Care Plan goals for specific interventions      Outcome: Progressing     Problem: POSTPARTUM  Goal: Long Term Goal:Experiences normal postpartum course  Description: INTERVENTIONS:  - Assess and monitor vital signs and lab values.  - Assess fundus and lochia.  - Provide ice/sitz baths for perineum discomfort.  - Monitor healing of incision/episiotomy/laceration, and assess for signs and symptoms of infection and hematoma.  - Assess bladder function and monitor for bladder distention.  - Provide/instruct/assist with pericare as needed.  - Provide VTE prophylaxis as needed.  - Monitor bowel function.  - Encourage ambulation and provide assistance as needed.  - Assess and monitor emotional status and provide social service/psych resources as needed.  - Utilize standard precautions and use personal protective equipment as indicated. Ensure aseptic care of all intravenous lines and invasive tubes/drains.  - Obtain  immunization and exposure to communicable diseases history.  Outcome: Progressing  Goal: Optimize infant feeding at the breast  Description: INTERVENTIONS:  - Initiate breast feeding within first hour after birth.   - Monitor effectiveness of current breast feeding efforts.  - Assess support systems available to mother/family.  - Identify cultural beliefs/practices regarding lactation, letdown techniques, maternal food preferences.  - Assess mother's knowledge and previous experience with breast feeding.  - Provide information as needed about early infant feeding cues (e.g., rooting, lip smacking, sucking fingers/hand) versus late cue of crying.  - Discuss/demonstrate breast feeding aids (e.g., infant sling, nursing footstool/pillows, and breast pumps).  - Encourage mother/other family members to express feelings/concerns, and actively listen.  - Educate father/SO about benefits of breast feeding and how to manage common lactation challenges.  - Recommend avoidance of specific medications or substances incompatible with breast feeding.  - Assess and monitor for signs of nipple pain/trauma.  - Instruct and provide assistance with proper latch.  - Review techniques for milk expression (breast pumping) and storage of breast milk. Provide pumping equipment/supplies, instructions and assistance, as needed.  - Encourage rooming-in and breast feeding on demand.  - Encourage skin-to-skin contact.  - Provide LC support as needed.  - Assess for and manage engorgement.  - Provide breast feeding education handouts and information on community breast feeding support.   Outcome: Progressing  Goal: Establishment of adequate milk supply with medication/procedure interruptions  Description: INTERVENTIONS:  - Review techniques for milk expression (breast pumping).   - Provide pumping equipment/supplies, instructions, and assistance until it is safe to breastfeed infant.  Outcome: Progressing  Goal: Experiences normal breast weaning  course  Description: INTERVENTIONS:  - Assess for and manage engorgement.  - Instruct on breast care.  - Provide comfort measures.  Outcome: Progressing  Goal: Appropriate maternal -  bonding  Description: INTERVENTIONS:  - Assess caregiver- interactions.  - Assess caregiver's emotional status and coping mechanisms.  - Encourage caregiver to participate in  daily care.  - Assess support systems available to mother/family.  - Provide /case management support as needed.  Outcome: Progressing

## 2025-05-20 NOTE — DISCHARGE SUMMARY
[facility]  part of [facility]    Discharge Summary    Leonarda Rosario Patient Status:  Inpatient    1995 MRN [id number]   Location [facility] 3SE Attending Lela Pollard MD   Hosp Day # 1 PCP Deion Espinosa MD     Date of Admission: 2025    Admission Diagnoses: pregnancy  History of gestational hypertension    Date of Discharge: 2025    Discharge Diagnoses:S/P Vaginal Delivery    Episode Diagnoses:   Pregnancy Problems (from 25 to present)       No problems associated with this episode.                  Hospital Course:     EDC: Estimated Date of Delivery: 25    Gestational Age: 39w3d    Date of Delivery: 2025  Time of Delivery: 3:50 PM    Antepartum complications: none    Delivered By: LELA POLLARD    Delivery Method: Normal spontaneous vaginal delivery    Delivery Procedures:     Baby: female      Apgars:  1 minute:   8                 5 minutes: 9                          10 minutes:      Feeding Method:The patient is currently breastfeeding.     Intrapartum Complications: None    Lacerations      Perineal lacerations: None      Vaginal laceration?: No      Cervical laceration?: No    Clitoral laceration?: No             Episiotomy: None    Placenta: Spontaneous    Postpartum complications: None                  Discharge Plan:   Discharge Condition: Stable    Discharge medications:  Current Discharge Medication List        New Orders    Details   acetaminophen 500 MG Oral Tab Take 1 tablet (500 mg total) by mouth every 6 (six) hours as needed.      ibuprofen 600 MG Oral Tab Take 1 tablet (600 mg total) by mouth every 6 (six) hours.           Home Meds - Unchanged    Details   Prenatal MV & Min w/FA-DHA (PRENATAL ADULT GUMMY/DHA/FA) 0.4-25 MG Oral Chew Tab                    Discharge Diet: As tolerated    Discharge Activity: Pelvic rest until cleared    Follow up:     Follow Up in the Office: 3-5 days for blood pressure check and 6  weeks for postpartum visit     Follow-up Information       Lela Pfeiffer MD Follow up in 6 week(s).    Specialty: OBSTETRICS & GYNECOLOGY  Contact information:  [address]  [facility] [address]  [phone]               Lela Pfeiffer MD Follow up in 3 day(s).    Specialty: OBSTETRICS & GYNECOLOGY  Why: Blood pressure check  Contact information:  [address] [facility] [address]  [phone]                                     Vicki Acevedo MD  5/20/2025

## 2025-05-20 NOTE — PROGRESS NOTES
[facility]  part of [facility]    OB/GYNE Progress Note      Leonarda Rosario Patient Status:  Inpatient    1995 MRN [id number]   Location [facility] 3SE Attending Lela Pfeiffer MD   Hosp Day # 1 PCP Deion Espinosa MD       Assessment/Plan       Assessment  Problems: Problem List[1]       PPD # 1  Home if OK with pediatrician        Subjective       Review of Systems:  Constitutional: feeling well and pain free      Objective   Vital signs in last 24 hours:  Temp:  [97.9 °F (36.6 °C)-98.5 °F (36.9 °C)] 98 °F (36.7 °C)  Pulse:  [] 77  Resp:  [16-18] 16  BP: ()/() 117/90  SpO2:  [97 %-100 %] 99 %    Input/Output:    Intake/Output Summary (Last 24 hours) at 2025 0911  Last data filed at 2025 2100  Gross per 24 hour   Intake 1538.43 ml   Output 1985 ml   Net -446.57 ml       Weight (Last 6):  Wt Readings from Last 6 Encounters:   25 229 lb 15 oz (104.3 kg)   25 230 lb (104.3 kg)   24 223 lb (101.2 kg)   20 199 lb (90.3 kg)   19 209 lb (94.8 kg)   08 139 lb (63 kg) (84%, Z= 1.01)*     * Growth percentiles are based on CDC (Girls, 2-20 Years) data.     Body mass index is 38.26 kg/m².    Constitutional: comfortable  Uterus: fundus firm and fundus below umbilicus  Extremities: No calf tenderness      Walls Catheter Information  Does patient have a walls catheter: no      Results:     Lab Results   Component Value Date    WBC 12.5 2025    HGB 10.2 2025    HCT 31.3 2025    .0 2025         No results found.    Vicki Acevedo MD  2025  9:11 AM         [1]   Patient Active Problem List  Diagnosis    Acute bronchitis    Routine infant or child health check    History of gestational hypertension

## 2025-06-06 ENCOUNTER — TELEPHONE (OUTPATIENT)
Dept: OBGYN UNIT | Facility: HOSPITAL | Age: 30
End: 2025-06-06

## 2025-06-06 NOTE — PROGRESS NOTES
Reviewed self and infant care with mom, she verbalizes understanding of instruction reviewed. Encouraged to follow up with MD's as directed with questions/concerns.

## (undated) NOTE — LETTER
Date & Time: 10/13/2024, 10:26 AM  Patient: Leonarda Rosario  Encounter Provider(s):    Cynthia Diehl APRN       To Whom It May Concern:    Leonarda Rosario was seen and treated in our department on 10/13/2024. She can return to work 10/17/24.    If you have any questions or concerns, please do not hesitate to call.        _____________________________  Physician/APC Signature

## (undated) NOTE — LETTER
[facility]  Administration of Anesthesia  I Leonarda LATHAM Abraham agree to be cared for by a physician anesthesiologist alone and/or with a nurse anesthetist, who is specially trained to monitor me and give me medicine to put me to sleep or keep me comfortable during my procedure    I understand that my anesthesiologist and/or anesthetist is not an employee or agent of [facility] or [email or web address]. He or she works for [facility]    As the patient asking for anesthesia services, I agree to:  Allow the anesthesiologist (anesthesia doctor) to give me medicine and do additional procedures as necessary. Some examples are: Starting or using an “IV” to give me medicine, fluids or blood during my procedure, and having a breathing tube placed to help me breathe when I’m asleep (intubation). In the event that my heart stops working properly, I understand that my anesthesiologist will make every effort to sustain my life, unless otherwise directed by [facility] Do Not Resuscitate documents.  Tell my anesthesia doctor before my procedure:  If I am pregnant.  The last time that I ate or drank.  iii. All of the medicines I take (including prescriptions, herbal supplements, and pills I can buy without a prescription (including street drugs/illegal medications). Failure to inform my anesthesiologist about these medicines may increase my risk of anesthetic complications.  iv.If I am allergic to anything or have had a reaction to anesthesia before.  I understand how the anesthesia medicine will help me (benefits).  I understand that with any type of anesthesia medicine there are risks:  The most common risks are: nausea, vomiting, sore throat, muscle soreness, damage to my eyes, mouth, or teeth (from breathing tube placement).  Rare risks include: remembering what happened during my procedure, allergic reactions to medications, injury to my airway, heart, lungs, vision, nerves, or  muscles and in extremely rare instances death.  My doctor has explained to me other choices available to me for my care (alternatives).  Pregnant Patients (“epidural”):  I understand that the risks of having an epidural (medicine given into my back to help control pain during labor), include itching, low blood pressure, difficulty urinating, headache or slowing of the baby’s heart. Very rare risks include infection, bleeding, seizure, irregular heart rhythms and nerve injury.  Regional Anesthesia (“spinal”, “epidural”, & “nerve blocks”):  I understand that rare but potential complications include headache, bleeding, infection, seizure, irregular heart rhythms, and nerve injury.    _____________________________________________________________________________  Patient (or Representative) Signature/Relationship to Patient  Date   Time    _____________________________________________________________________________   Name (if used)    Language/Organization   Time    _____________________________________________________________________________  Nurse Anesthetist Signature     Date   Time  _____________________________________________________________________________  Anesthesiologist Signature     Date   Time  I have discussed the procedure and information above with the patient (or patient’s representative) and answered their questions. The patient or their representative has agreed to have anesthesia services.    _____________________________________________________________________________  Witness        Date   Time  I have verified that the signature is that of the patient or patient’s representative, and that it was signed before the procedure  Patient Name: Leonarda LATHAM Abraham     : 1995                 Printed: 2025 at 4:14 AM    Medical Record #: [id number]                                            Page 1 of 1  ----------ANESTHESIA CONSENT----------

## (undated) NOTE — MR AVS SNAPSHOT
After Visit Summary   6/30/2020    Leah Montez    MRN: [id number]           Visit Information     Date & Time  6/30/2020  3:20 PM Provider  Preston Landry MD [address] [place] [address], [address] [place] [address],[address], [facility]  Phone  3 drinks, candies and desserts   Eat plenty of low-fat dairy products High fat meats and dairy   Choose whole grain products Foods high in sodium   Water is best for hydration Fast food.    Eat at home when possible     Tips for increasing your physical activ a treatment plan within a few hours.  ONLINE VISIT  Primary Care Providers  Treatment for mild illness or injury that does not require immediate attention VIDEO VISITS  Average cost  $35*    e-VISTS  Average cost  $35*     SAME DAY APPOINTMENTS   Available